# Patient Record
Sex: MALE | Race: WHITE
[De-identification: names, ages, dates, MRNs, and addresses within clinical notes are randomized per-mention and may not be internally consistent; named-entity substitution may affect disease eponyms.]

---

## 2022-09-02 ENCOUNTER — HOSPITAL ENCOUNTER (EMERGENCY)
Dept: HOSPITAL 54 - ER | Age: 57
Discharge: HOME | End: 2022-09-02
Payer: SELF-PAY

## 2022-09-02 VITALS — SYSTOLIC BLOOD PRESSURE: 151 MMHG | DIASTOLIC BLOOD PRESSURE: 89 MMHG

## 2022-09-02 VITALS — WEIGHT: 200 LBS | BODY MASS INDEX: 29.62 KG/M2 | HEIGHT: 69 IN

## 2022-09-02 DIAGNOSIS — N20.0: Primary | ICD-10-CM

## 2022-09-02 DIAGNOSIS — R11.2: ICD-10-CM

## 2022-09-02 DIAGNOSIS — F17.200: ICD-10-CM

## 2022-09-02 LAB
ALBUMIN SERPL BCP-MCNC: 3.9 G/DL (ref 3.4–5)
ALP SERPL-CCNC: 77 U/L (ref 46–116)
ALT SERPL W P-5'-P-CCNC: 49 U/L (ref 12–78)
AST SERPL W P-5'-P-CCNC: 22 U/L (ref 15–37)
BASOPHILS # BLD AUTO: 0 K/UL (ref 0–0.2)
BASOPHILS NFR BLD AUTO: 0.3 % (ref 0–2)
BILIRUB DIRECT SERPL-MCNC: 0.1 MG/DL (ref 0–0.2)
BILIRUB SERPL-MCNC: 0.4 MG/DL (ref 0.2–1)
BILIRUB UR QL STRIP: (no result)
BUN SERPL-MCNC: 21 MG/DL (ref 7–18)
CALCIUM SERPL-MCNC: 9.1 MG/DL (ref 8.5–10.1)
CHLORIDE SERPL-SCNC: 102 MMOL/L (ref 98–107)
CO2 SERPL-SCNC: 24 MMOL/L (ref 21–32)
COLOR UR: YELLOW
CREAT SERPL-MCNC: 1.7 MG/DL (ref 0.6–1.3)
EOSINOPHIL NFR BLD AUTO: 0.4 % (ref 0–6)
GLUCOSE SERPL-MCNC: 146 MG/DL (ref 74–106)
GLUCOSE UR STRIP-MCNC: NEGATIVE MG/DL
HCT VFR BLD AUTO: 43 % (ref 39–51)
HGB BLD-MCNC: 14.2 G/DL (ref 13.5–17.5)
LEUKOCYTE ESTERASE UR QL STRIP: NEGATIVE
LIPASE SERPL-CCNC: 87 U/L (ref 73–393)
LYMPHOCYTES NFR BLD AUTO: 1.4 K/UL (ref 0.8–4.8)
LYMPHOCYTES NFR BLD AUTO: 11.4 % (ref 20–44)
MCHC RBC AUTO-ENTMCNC: 33 G/DL (ref 31–36)
MCV RBC AUTO: 87 FL (ref 80–96)
MONOCYTES NFR BLD AUTO: 0.7 K/UL (ref 0.1–1.3)
MONOCYTES NFR BLD AUTO: 5.6 % (ref 2–12)
NEUTROPHILS # BLD AUTO: 10.3 K/UL (ref 1.8–8.9)
NEUTROPHILS NFR BLD AUTO: 82.3 % (ref 43–81)
NITRITE UR QL STRIP: NEGATIVE
PH UR STRIP: 6 [PH] (ref 5–8)
PLATELET # BLD AUTO: 239 K/UL (ref 150–450)
POTASSIUM SERPL-SCNC: 3.5 MMOL/L (ref 3.5–5.1)
PROT SERPL-MCNC: 7.5 G/DL (ref 6.4–8.2)
PROT UR QL STRIP: (no result) MG/DL
RBC # BLD AUTO: 4.94 MIL/UL (ref 4.5–6)
RBC #/AREA URNS HPF: (no result) /HPF (ref 0–2)
SODIUM SERPL-SCNC: 138 MMOL/L (ref 136–145)
UROBILINOGEN UR STRIP-MCNC: 0.2 EU/DL
WBC #/AREA URNS HPF: (no result) /HPF (ref 0–3)
WBC NRBC COR # BLD AUTO: 12.5 K/UL (ref 4.3–11)

## 2022-09-02 PROCEDURE — 74176 CT ABD & PELVIS W/O CONTRAST: CPT

## 2022-09-02 PROCEDURE — 80076 HEPATIC FUNCTION PANEL: CPT

## 2022-09-02 PROCEDURE — 80048 BASIC METABOLIC PNL TOTAL CA: CPT

## 2022-09-02 PROCEDURE — 96374 THER/PROPH/DIAG INJ IV PUSH: CPT

## 2022-09-02 PROCEDURE — 36415 COLL VENOUS BLD VENIPUNCTURE: CPT

## 2022-09-02 PROCEDURE — 85025 COMPLETE CBC W/AUTO DIFF WBC: CPT

## 2022-09-02 PROCEDURE — 99284 EMERGENCY DEPT VISIT MOD MDM: CPT

## 2022-09-02 PROCEDURE — 96361 HYDRATE IV INFUSION ADD-ON: CPT

## 2022-09-02 PROCEDURE — 83690 ASSAY OF LIPASE: CPT

## 2022-09-02 PROCEDURE — 81001 URINALYSIS AUTO W/SCOPE: CPT

## 2022-09-02 PROCEDURE — 85730 THROMBOPLASTIN TIME PARTIAL: CPT

## 2022-09-02 PROCEDURE — 96375 TX/PRO/DX INJ NEW DRUG ADDON: CPT

## 2022-09-02 NOTE — NUR
TO ER BED 3. BIBS C/O L SIDE ABDOMINAL PAIN X 2 DAYS WORST TODAY. -N/V/D. PT IS 
ALERT AND ORIENTED. AMBULATORY WITH STEADY GAIT. RR IS EVEN AND NONLABORED. 
CONNECTED TO MONITOR. AWAITING MD FERRER